# Patient Record
Sex: FEMALE | Race: WHITE | Employment: FULL TIME | URBAN - METROPOLITAN AREA
[De-identification: names, ages, dates, MRNs, and addresses within clinical notes are randomized per-mention and may not be internally consistent; named-entity substitution may affect disease eponyms.]

---

## 2017-08-21 ENCOUNTER — APPOINTMENT (OUTPATIENT)
Dept: GENERAL RADIOLOGY | Age: 47
End: 2017-08-21
Attending: EMERGENCY MEDICINE
Payer: MEDICAID

## 2017-08-21 ENCOUNTER — HOSPITAL ENCOUNTER (OUTPATIENT)
Age: 47
Discharge: HOME | End: 2017-08-21
Attending: EMERGENCY MEDICINE
Payer: MEDICAID

## 2017-08-21 VITALS
SYSTOLIC BLOOD PRESSURE: 103 MMHG | HEIGHT: 67 IN | TEMPERATURE: 99 F | DIASTOLIC BLOOD PRESSURE: 68 MMHG | HEART RATE: 87 BPM | OXYGEN SATURATION: 100 % | BODY MASS INDEX: 22.76 KG/M2 | RESPIRATION RATE: 20 BRPM | WEIGHT: 145 LBS

## 2017-08-21 DIAGNOSIS — S93.601A SPRAIN OF RIGHT FOOT, INITIAL ENCOUNTER: ICD-10-CM

## 2017-08-21 DIAGNOSIS — S63.501A SPRAIN OF RIGHT WRIST, INITIAL ENCOUNTER: Primary | ICD-10-CM

## 2017-08-21 PROCEDURE — 73630 X-RAY EXAM OF FOOT: CPT | Performed by: EMERGENCY MEDICINE

## 2017-08-21 PROCEDURE — 99204 OFFICE O/P NEW MOD 45 MIN: CPT

## 2017-08-21 PROCEDURE — 73110 X-RAY EXAM OF WRIST: CPT | Performed by: EMERGENCY MEDICINE

## 2017-08-21 PROCEDURE — 90471 IMMUNIZATION ADMIN: CPT

## 2017-08-21 RX ORDER — NORETHINDRONE ACETATE AND ETHINYL ESTRADIOL 1; 20 MG/1; UG/1
TABLET ORAL
COMMUNITY
Start: 2017-08-20 | End: 2017-08-21

## 2017-08-21 RX ORDER — ONDANSETRON 4 MG/1
4 TABLET, ORALLY DISINTEGRATING ORAL EVERY 4 HOURS PRN
Qty: 20 TABLET | Refills: 0 | Status: SHIPPED | OUTPATIENT
Start: 2017-08-21

## 2017-08-21 RX ORDER — GINSENG 100 MG
CAPSULE ORAL ONCE
Status: COMPLETED | OUTPATIENT
Start: 2017-08-21 | End: 2017-08-21

## 2017-08-21 RX ORDER — HYDROCODONE BITARTRATE AND ACETAMINOPHEN 7.5; 325 MG/1; MG/1
TABLET ORAL
COMMUNITY
Start: 2017-06-22

## 2017-08-21 NOTE — ED PROVIDER NOTES
Unruly Freed is a 49year old female who presents for evaluation after a fall  HPI:   Pt complains of right wrist pain and right foot pain.   Patient states she was getting off of a boat yesterday she slipped and fell landing on her right upper and lower ex ankle are nontender to palpation Achilles is nontender to palpation. There is soft tissue swelling with ecchymosis present to the dorsal lateral foot.   The patient complains of pain with plantar and dorsiflexion of the foot  NEURO: Oriented times three,cr examination. MDM     She will have splint applied to wrist and be given a postoperative cast shoe.   Patient declined having tetanus vaccination she believes her last tetanus immunization was less than 10 years ago she will check

## 2017-08-21 NOTE — ED INITIAL ASSESSMENT (HPI)
Ozzie Do yesterday getting off a boat. Unknown if head injury. Vomited x 2 this morning. C/o headache. Pain to right thumb and wrist. Limited ROM. + distal CMS. Also c/o pain to right dorsal foot with swelling. 2 abrasions noted. Last TDaP unknown.

## 2017-08-21 NOTE — ED NOTES
Back from xray. Radiologist reading pending. Patient given TDaP vaccine info sheet. Unsure if she wants to receive booster today. \"Thinking it over\".

## 2021-10-21 ENCOUNTER — HOSPITAL ENCOUNTER (EMERGENCY)
Facility: HOSPITAL | Age: 51
Discharge: HOME | End: 2021-10-21
Attending: EMERGENCY MEDICINE
Payer: MEDICAID

## 2021-10-21 ENCOUNTER — APPOINTMENT (OUTPATIENT)
Dept: CT IMAGING | Facility: HOSPITAL | Age: 51
End: 2021-10-21
Attending: EMERGENCY MEDICINE
Payer: MEDICAID

## 2021-10-21 VITALS
RESPIRATION RATE: 18 BRPM | SYSTOLIC BLOOD PRESSURE: 110 MMHG | OXYGEN SATURATION: 96 % | WEIGHT: 150 LBS | TEMPERATURE: 98 F | DIASTOLIC BLOOD PRESSURE: 78 MMHG | BODY MASS INDEX: 23 KG/M2 | HEART RATE: 71 BPM

## 2021-10-21 DIAGNOSIS — G43.809 OTHER MIGRAINE WITHOUT STATUS MIGRAINOSUS, NOT INTRACTABLE: ICD-10-CM

## 2021-10-21 DIAGNOSIS — J01.90 ACUTE SINUSITIS, RECURRENCE NOT SPECIFIED, UNSPECIFIED LOCATION: Primary | ICD-10-CM

## 2021-10-21 PROCEDURE — 70450 CT HEAD/BRAIN W/O DYE: CPT | Performed by: EMERGENCY MEDICINE

## 2021-10-21 PROCEDURE — 99284 EMERGENCY DEPT VISIT MOD MDM: CPT

## 2021-10-21 PROCEDURE — 96361 HYDRATE IV INFUSION ADD-ON: CPT

## 2021-10-21 PROCEDURE — 96375 TX/PRO/DX INJ NEW DRUG ADDON: CPT

## 2021-10-21 PROCEDURE — 96374 THER/PROPH/DIAG INJ IV PUSH: CPT

## 2021-10-21 RX ORDER — ONDANSETRON 2 MG/ML
4 INJECTION INTRAMUSCULAR; INTRAVENOUS ONCE
Status: COMPLETED | OUTPATIENT
Start: 2021-10-21 | End: 2021-10-21

## 2021-10-21 RX ORDER — CLINDAMYCIN HYDROCHLORIDE 300 MG/1
300 CAPSULE ORAL 3 TIMES DAILY
Qty: 15 CAPSULE | Refills: 0 | Status: SHIPPED | OUTPATIENT
Start: 2021-10-21 | End: 2021-10-26

## 2021-10-21 RX ORDER — ONDANSETRON 4 MG/1
4 TABLET, ORALLY DISINTEGRATING ORAL EVERY 4 HOURS PRN
Qty: 12 TABLET | Refills: 0 | Status: SHIPPED | OUTPATIENT
Start: 2021-10-21 | End: 2021-10-28

## 2021-10-21 RX ORDER — PREDNISONE 20 MG/1
40 TABLET ORAL DAILY
Qty: 6 TABLET | Refills: 0 | Status: SHIPPED | OUTPATIENT
Start: 2021-10-21 | End: 2021-10-24

## 2021-10-21 RX ORDER — KETOROLAC TROMETHAMINE 15 MG/ML
15 INJECTION, SOLUTION INTRAMUSCULAR; INTRAVENOUS ONCE
Status: COMPLETED | OUTPATIENT
Start: 2021-10-21 | End: 2021-10-21

## 2021-10-21 RX ORDER — KETOROLAC TROMETHAMINE 10 MG/1
10 TABLET, FILM COATED ORAL EVERY 6 HOURS PRN
Qty: 15 TABLET | Refills: 0 | Status: SHIPPED | OUTPATIENT
Start: 2021-10-21 | End: 2021-10-28

## 2021-10-21 NOTE — ED INITIAL ASSESSMENT (HPI)
Patient complains of migraine type headache x 2 hours pta, associated with n/v, denies Injury/trauma. Took tylenol with no relief. Reports cough x 2 days. Saw pcp and did not have cxr. Took home covid test, result negative. Hx migraines.

## 2021-10-23 NOTE — ED PROVIDER NOTES
Patient Seen in: [facility] AND [facility] Emergency Department    History   Patient presents with:  Headache    Stated Complaint: Headache/Migraine    HPI  Pt c/o a 10/10 headache that began days ago. Headache intense, throbbing.   This is  More intense compare reviewed. All other systems reviewed and negative except as noted above. PSFH elements reviewed from today and agreed except as otherwise stated in HPI.     Physical Exam     ED Triage Vitals [10/21/21 1432]   BP (!) 149/100   Pulse 104   Resp 18 [id number]  [phone]            Medications Prescribed:  Discharge Medication List as of 10/21/2021  5:42 PM    START taking these medications    Ketorolac Tromethamine 10 MG Oral Tab  Take 1 tablet (10 mg total) by mouth every 6 (six) hours as needed for P

## 2025-07-14 ENCOUNTER — HOSPITAL ENCOUNTER (EMERGENCY)
Facility: HOSPITAL | Age: 55
Discharge: HOME | End: 2025-07-14
Payer: MEDICAID

## 2025-07-14 VITALS
HEART RATE: 62 BPM | OXYGEN SATURATION: 98 % | WEIGHT: 169 LBS | SYSTOLIC BLOOD PRESSURE: 102 MMHG | RESPIRATION RATE: 18 BRPM | HEIGHT: 68 IN | TEMPERATURE: 98 F | DIASTOLIC BLOOD PRESSURE: 75 MMHG | BODY MASS INDEX: 25.61 KG/M2

## 2025-07-14 DIAGNOSIS — S71.111A LACERATION OF RIGHT THIGH, INITIAL ENCOUNTER: Primary | ICD-10-CM

## 2025-07-14 PROCEDURE — 12002 RPR S/N/AX/GEN/TRNK2.6-7.5CM: CPT

## 2025-07-14 PROCEDURE — 99283 EMERGENCY DEPT VISIT LOW MDM: CPT

## 2025-07-14 RX ORDER — BUPIVACAINE HYDROCHLORIDE 2.5 MG/ML
20 INJECTION, SOLUTION EPIDURAL; INFILTRATION; INTRACAUDAL; PERINEURAL ONCE
Status: COMPLETED | OUTPATIENT
Start: 2025-07-14 | End: 2025-07-14

## 2025-07-14 NOTE — ED INITIAL ASSESSMENT (HPI)
Rashmi arrived through triage with her  for further eval and treatment for laceration to lateral aspect of R upper thigh. Pt was carrying a TV when the screen broke. She was seen at the [place] walk-in clinic and a \"blood clotting powder\" was applied. Wound is currently dressed and bleeding is controlled. Unsure when tetanus vaccine last updated.

## 2025-07-14 NOTE — DISCHARGE INSTRUCTIONS
VISIT SUMMARY:  You came in today for a laceration on your right thigh that needed 11 stitches.     YOUR PLAN:  LACERATION ON RIGHT LATERAL THIGH: You have a 7 cm cut on your right thigh that needed stitches.  -The cut was cleaned thoroughly and closed with eleven stitches using an alternative anesthetic.  -Keep the area clean and dry. Wash it at least twice a day with mild soap and water.  -Apply antibiotic ointment and cover it with a bandage.  -The stitches should be removed in ten to fourteen days at an immediate care center or by your primary care doctor.  -Return to the clinic if you experience severe pain, skin redness, or pus.    Contains text generated by Charbel

## 2025-07-14 NOTE — ED PROVIDER NOTES
Patient Seen in: [facility] Emergency Department       The following individual(s) verbally consented to be recorded using ambient AI listening technology and understand that they can each withdraw their consent to this listening technology at any point by asking the clinician to turn off or pause the recording:    Patient name: Rashmi Winters  Additional names:      History     Chief Complaint   Patient presents with    Laceration/Abrasion       History of Present Illness  Ms. Rashmi Winters is a 54 year old female who presents with a laceration on her right lateral thigh requiring stitches. She was referred by a walk-in clinic due to the depth of the laceration.    She sustained the laceration on her right lateral thigh while moving a TV screen. The incident occurred when the TV screen twisted, and the corner of the screen cut her. Initially, she thought it was a scratch but noticed significant bleeding, prompting her  to suggest it was more serious.    She initially sought care at a walk-in clinic where it was determined that the laceration was too deep for them to manage. The clinic attempted to stop the bleeding with powder, but it was not fully effective.    Her last tetanus shot was in 2022. She has a known allergy to lidocaine, which causes vomiting and fainting, as experienced during dental procedures. She has had other procedures where alternative anesthetics were used successfully.      History reviewed. Past Medical History[1]    History reviewed. Past Surgical History[2]      Medications :  Prescriptions Prior to Admission[3]     Family History[4]    Smoking Status: Social Hx on file[5]    Constitutional and vital signs reviewed.      Social History and Family History elements reviewed from today, pertinent positives to the presenting problem noted.    Physical Exam     ED Triage Vitals [07/14/25 1251]   /75   Pulse 81   Resp 16   Temp 97.6 °F (36.4 °C)   Temp src Temporal   SpO2 97 %    O2 Device None (Room air)           Physical Exam  EXTREMITIES: Right lateral thigh with a 7 cm linear laceration, mildly gaping, no foreign bodies, no muscle or tendon exposure.  NEUROLOGICAL: Neurologically intact distally.     Physical Exam    ED Course      Labs Reviewed - No data to display  Laceration Repair Procedure Note  I discussed risks and benefits and verbal consent was obtained. Time out performed.  Location: Leg  right\  Length: 7 cm  Cleaning: extensive  Foreign Body: no foreign bodies  The wound area was irrigated with sterile saline and draped in a sterile fashion.  The wound area was anesthetized with Bupivacaine 0.25% without epinephrine.  The wound was explored with the following results No foreign bodies found, No tendon laceration seen.  The wound was repaired with 4-0 Ethilon; 11 sutures were used.  Suture Technique: simple and horizontal mattress  The wound was dressed and antibiotic ointment was applied.  Patient tolerated the procedure without complications.     As Interpreted by me    Imaging Results Available and Reviewed while in ED: No results found.  ED Medications Administered:   Medications   bupivacaine PF (Marcaine) 0.25% injection (20 mL Infiltration Given by Other 7/14/25 1333)         MDM     Vitals:    07/14/25 1251   BP: 112/75   Pulse: 81   Resp: 16   Temp: 97.6 °F (36.4 °C)   TempSrc: Temporal   SpO2: 97%   Weight: 76.7 kg   Height: 172.7 cm (5' 8\")     *I personally reviewed and interpreted all ED vitals.    Pulse Ox: 97%, Room air, Normal       Assessment & Plan  Laceration of right lateral thigh    An acute 7 cm linear laceration on the right lateral thigh is mildly gaping without foreign bodies, muscle, or tendon exposure. Neurologically intact distally. Requires suturing due to depth and width. Laceration irrigated thoroughly and closed with eleven sutures. Keep the area clean and dry, washing at least twice a day with mild soap and water. Apply antibiotic ointment  and cover with a bandage. Sutures should be removed in ten to fourteen days at immediate care or by her primary care physician. Return if severe pain, skin redness, or purulent drainage develops.           Complicating Factors: The patient already has does not have a problem list on file. to contribute to the complexity of this ED evaluation.    Medical Decision Making      Condition upon leaving the department: Stable    Disposition and Plan     Clinical Impression:  1. Laceration of right thigh, initial encounter        Disposition:  Discharge    Follow-up:  [facility] [place] [id number]  [phone]  Follow up in 2 week(s)  For suture removal      Medications Prescribed:  Current Discharge Medication List                       [1] History reviewed. No pertinent past medical history.  [2]   Past Surgical History:  Procedure Laterality Date    Other surgical history      child birth 1   [3] (Not in a hospital admission)   [4]   Family History  Problem Relation Age of Onset    Heart Disease Father         CAD   [5]   Social History  Socioeconomic History    Marital status:    Tobacco Use    Smoking status: Never    Smokeless tobacco: Never   Substance and Sexual Activity    Alcohol use: Yes     Comment: occasionally, wine   Other Topics Concern    Caffeine Concern Yes     Comment: tea, 2 cups daily